# Patient Record
Sex: FEMALE | Race: WHITE | Employment: FULL TIME | URBAN - METROPOLITAN AREA
[De-identification: names, ages, dates, MRNs, and addresses within clinical notes are randomized per-mention and may not be internally consistent; named-entity substitution may affect disease eponyms.]

---

## 2022-04-09 ENCOUNTER — HISTORICAL (OUTPATIENT)
Dept: ADMINISTRATIVE | Facility: HOSPITAL | Age: 27
End: 2022-04-09

## 2022-04-25 VITALS
BODY MASS INDEX: 24.14 KG/M2 | WEIGHT: 131.19 LBS | HEIGHT: 62 IN | SYSTOLIC BLOOD PRESSURE: 117 MMHG | OXYGEN SATURATION: 99 % | DIASTOLIC BLOOD PRESSURE: 80 MMHG

## 2022-09-15 ENCOUNTER — HOSPITAL ENCOUNTER (EMERGENCY)
Facility: HOSPITAL | Age: 27
Discharge: HOME | End: 2022-09-15
Attending: INTERNAL MEDICINE
Payer: COMMERCIAL

## 2022-09-15 VITALS
WEIGHT: 130 LBS | HEART RATE: 89 BPM | DIASTOLIC BLOOD PRESSURE: 92 MMHG | OXYGEN SATURATION: 99 % | HEIGHT: 62 IN | RESPIRATION RATE: 18 BRPM | TEMPERATURE: 98 F | BODY MASS INDEX: 23.92 KG/M2 | SYSTOLIC BLOOD PRESSURE: 143 MMHG

## 2022-09-15 DIAGNOSIS — N93.8 DUB (DYSFUNCTIONAL UTERINE BLEEDING): Primary | ICD-10-CM

## 2022-09-15 DIAGNOSIS — Z32.02 ENCOUNTER FOR PREGNANCY TEST WITH RESULT NEGATIVE: ICD-10-CM

## 2022-09-15 LAB
APPEARANCE UR: CLEAR
B-HCG SERPL QL: NEGATIVE
BACTERIA #/AREA URNS AUTO: ABNORMAL /HPF
BILIRUB UR QL STRIP.AUTO: NEGATIVE MG/DL
COLOR UR AUTO: YELLOW
GLUCOSE UR QL STRIP.AUTO: NEGATIVE MG/DL
KETONES UR QL STRIP.AUTO: NEGATIVE MG/DL
LEUKOCYTE ESTERASE UR QL STRIP.AUTO: NEGATIVE UNIT/L
NITRITE UR QL STRIP.AUTO: NEGATIVE
PH UR STRIP.AUTO: 7.5 [PH]
PROT UR QL STRIP.AUTO: NEGATIVE MG/DL
RBC #/AREA URNS AUTO: ABNORMAL /HPF
RBC UR QL AUTO: ABNORMAL UNIT/L
SP GR UR STRIP.AUTO: 1.02
SQUAMOUS #/AREA URNS AUTO: ABNORMAL /HPF
UROBILINOGEN UR STRIP-ACNC: 0.2 MG/DL
WBC #/AREA URNS AUTO: ABNORMAL /HPF

## 2022-09-15 PROCEDURE — 96372 THER/PROPH/DIAG INJ SC/IM: CPT | Performed by: INTERNAL MEDICINE

## 2022-09-15 PROCEDURE — 81025 URINE PREGNANCY TEST: CPT | Performed by: INTERNAL MEDICINE

## 2022-09-15 PROCEDURE — 81001 URINALYSIS AUTO W/SCOPE: CPT | Performed by: INTERNAL MEDICINE

## 2022-09-15 PROCEDURE — 99284 EMERGENCY DEPT VISIT MOD MDM: CPT

## 2022-09-15 PROCEDURE — 63600175 PHARM REV CODE 636 W HCPCS: Performed by: INTERNAL MEDICINE

## 2022-09-15 RX ORDER — KETOROLAC TROMETHAMINE 30 MG/ML
60 INJECTION, SOLUTION INTRAMUSCULAR; INTRAVENOUS ONCE
Status: COMPLETED | OUTPATIENT
Start: 2022-09-15 | End: 2022-09-15

## 2022-09-15 RX ADMIN — KETOROLAC TROMETHAMINE 60 MG: 60 INJECTION, SOLUTION INTRAMUSCULAR at 02:09

## 2022-09-15 NOTE — ED TRIAGE NOTES
Pt c/o vaginal bleeding onset last night, along with low abd pain. States had positive pregnancy test last week.

## 2022-09-15 NOTE — ED PROVIDER NOTES
Source of History:  Patient, no limitations    Chief complaint:  Vaginal Bleeding (Pt c/o vaginal bleeding onset last night, along with low abd pain. States had positive pregnancy test last week.)      HPI:  Jana Alejo is a 27 y.o. female presenting with Vaginal Bleeding (Pt c/o vaginal bleeding onset last night, along with low abd pain. States had positive pregnancy test last week.)       Vaginal bleeding since last night, 2 positive home pregnancy tests, menstrual cycle is 1 week late, does not know blood type  The patient complains vaginal bleeding described as heavier than period, using several pad(s) since onset. Onset of symptoms was abrupt starting 1 day ago. Severity of symptoms at worst was moderate. Symptoms occur spontaneous  Symptoms have been intermittent. Symptoms are aggravated by nothing, alleviated by nothing and are associated with cramping. Other modifying factors include none. Previous evaluation includes none. Care prior to arrival consisted of nothing, with no relief.      Review of Systems   Constitutional symptoms:  Negative except as documented in HPI.   Skin symptoms:  Negative except as documented in HPI.   HEENT symptoms:  Negative except as documented in HPI.   Respiratory symptoms:  Negative except as documented in HPI.   Cardiovascular symptoms:  Negative except as documented in HPI.   Gastrointestinal symptoms:  Negative except as documented in HPI.    Genitourinary symptoms:  Negative except as documented in HPI.   Musculoskeletal symptoms:  Negative except as documented in HPI.   Neurologic symptoms:  Negative except as documented in HPI.   Psychiatric symptoms:  Negative except as documented in HPI.   Allergy/immunologic symptoms:  Negative except as documented in HPI.             Additional review of systems information: All other systems reviewed and otherwise negative.      Review of patient's allergies indicates:  No Known Allergies    PMH:  As per HPI and below:    No  "past medical history on file.     No family history on file.    No past surgical history on file.         There is no problem list on file for this patient.       Physical Exam:    BP (!) 158/104 (BP Location: Left arm, Patient Position: Sitting)   Pulse 89   Temp 98.4 °F (36.9 °C) (Temporal)   Resp 18   Ht 5' 2" (1.575 m)   Wt 59 kg (130 lb)   LMP 08/15/2022 Comment: no due date yet.  SpO2 100%   BMI 23.78 kg/m²     Nursing note and vital signs reviewed.    General:  Alert, no acute distress.   Skin: Normal for Ethnic Origin, No cyanosis  HEENT: Normocephalic and atraumatic, Vision unchanged, Pupils symmetric, No icterus , Nasal mucosa is pink and moist  Cardiovascular:  Regular rate and rhythm, No edema  Chest Wall: No deformity, equal chest rise  Respiratory:  Lungs are clear to auscultation, respirations are non-labored.    Musculoskeletal:  No deformity, Normal perfusion to all extremities  Back: No bony tenderness  : No suprapubic pain, No CVA tenderness  Gastrointestinal:  Soft, Nontender, Non distended, Normal bowel sounds.    Neurological:  Alert and oriented to person, place, time, and situation, normal motor observed, normal speech observed.    Psychiatric:  Cooperative, appropriate mood & affect.         Labs that have been ordered have been independently reviewed and interpreted by myself.     Old Chart Reviewed.      Initial Impression/ Differential Dx:  Threatened , spontaneous , implantation bleeding, placental abnormality including placenta previa or abruptio, vaginitis, UTI, rectal bleeding      MDM:      Reviewed Nurses Note.    Reviewed Pertinent old records.    Orders Placed This Encounter    Urinalysis, Reflex to Urine Culture Urine, Clean Catch    Pregnancy, urine rapid    Urinalysis, Microscopic    ketorolac injection 60 mg                    Labs Reviewed   URINALYSIS, REFLEX TO URINE CULTURE - Abnormal; Notable for the following components:       Result Value    " Blood, UA Large (*)     All other components within normal limits   URINALYSIS, MICROSCOPIC - Abnormal; Notable for the following components:    Bacteria, UA Trace (*)     RBC, UA 11-20 (*)     Squamous Epithelial Cells, UA Few (*)     All other components within normal limits   PREGNANCY TEST, URINE RAPID - Normal          No orders to display        Admission on 09/15/2022   Component Date Value Ref Range Status    Color, UA 09/15/2022 Yellow  Yellow, Colorless, Other, Clear Final    Appearance, UA 09/15/2022 Clear  Clear Final    Specific Gravity, UA 09/15/2022 1.025   Final    pH, UA 09/15/2022 7.5  5.0, 5.5, 6.0, 6.5, 7.0, 7.5, 8.0, 8.5 Final    Protein, UA 09/15/2022 Negative  Negative, 300  mg/dL Final    Glucose, UA 09/15/2022 Negative  Negative, Normal mg/dL Final    Ketones, UA 09/15/2022 Negative  Negative, +1, +2, +3, +4, +5, >=160, >=80 mg/dL Final    Blood, UA 09/15/2022 Large (A)  Negative unit/L Final    Bilirubin, UA 09/15/2022 Negative  Negative mg/dL Final    Urobilinogen, UA 09/15/2022 0.2  0.2, 1.0, Normal mg/dL Final    Nitrites, UA 09/15/2022 Negative  Negative Final    Leukocyte Esterase, UA 09/15/2022 Negative  Negative, 75  unit/L Final    Beta hCG Qualitative, Urine 09/15/2022 Negative  Negative Final    Bacteria, UA 09/15/2022 Trace (A)  None Seen, Rare, Occasional /HPF Final    RBC, UA 09/15/2022 11-20 (A)  None Seen, 0-2, 3-5, 0-5 /HPF Final    WBC, UA 09/15/2022 0-2  None Seen, 0-2, 3-5, 0-5 /HPF Final    Squamous Epithelial Cells, UA 09/15/2022 Few (A)  None Seen, Rare, Occasional, Occ /HPF Final       Imaging Results    None                                              Diagnostic Impression:    1. DUB (dysfunctional uterine bleeding)    2. Encounter for pregnancy test with result negative         ED Disposition Condition    Discharge Stable             Follow-up Information       [facility] - Emergency Dept.    Specialty: Emergency Medicine  Why: If symptoms  worsen  Contact information:  [address]  [facility] [id number] [phone]                            ED Prescriptions    None       Follow-up Information       Follow up With Specialties Details Why Contact Info    [facility] - Emergency Dept Emergency Medicine  If symptoms worsen [address]  [facility] [id number] [phone]             Stalin Pollack,   09/15/22 1430

## 2022-09-27 DIAGNOSIS — F32.A DEPRESSION, UNSPECIFIED DEPRESSION TYPE: Primary | ICD-10-CM

## 2022-09-27 RX ORDER — VENLAFAXINE HYDROCHLORIDE 150 MG/1
150 CAPSULE, EXTENDED RELEASE ORAL DAILY
Qty: 90 CAPSULE | Refills: 0 | Status: SHIPPED | OUTPATIENT
Start: 2022-09-27 | End: 2023-01-04

## 2022-09-27 RX ORDER — VENLAFAXINE HYDROCHLORIDE 150 MG/1
150 CAPSULE, EXTENDED RELEASE ORAL DAILY
COMMUNITY
Start: 2022-07-06 | End: 2022-09-27 | Stop reason: SDUPTHER

## 2022-09-27 NOTE — TELEPHONE ENCOUNTER
Pt would like refill on Venlafaxine and would like a 90 day supply, meds are working really well.

## 2022-09-27 NOTE — TELEPHONE ENCOUNTER
I have ordered the following medications. Patient will need to make an appointment for further refills. Please notify the patient.    Medications Ordered This Encounter   Medications    venlafaxine (EFFEXOR-XR) 150 MG Cp24     Sig: Take 1 capsule (150 mg total) by mouth once daily.     Dispense:  90 capsule     Refill:  0

## 2023-01-04 ENCOUNTER — OFFICE VISIT (OUTPATIENT)
Dept: FAMILY MEDICINE | Facility: CLINIC | Age: 28
End: 2023-01-04
Payer: COMMERCIAL

## 2023-01-04 VITALS — HEIGHT: 62 IN | WEIGHT: 140 LBS | BODY MASS INDEX: 25.76 KG/M2

## 2023-01-04 DIAGNOSIS — Z11.4 ENCOUNTER FOR SCREENING FOR HIV: ICD-10-CM

## 2023-01-04 DIAGNOSIS — F41.8 MIXED ANXIETY DEPRESSIVE DISORDER: Primary | ICD-10-CM

## 2023-01-04 DIAGNOSIS — Z11.59 ENCOUNTER FOR HEPATITIS C SCREENING TEST FOR LOW RISK PATIENT: ICD-10-CM

## 2023-01-04 DIAGNOSIS — Z13.220 NEED FOR LIPID SCREENING: ICD-10-CM

## 2023-01-04 DIAGNOSIS — Z00.00 ANNUAL PHYSICAL EXAM: ICD-10-CM

## 2023-01-04 PROCEDURE — 1159F PR MEDICATION LIST DOCUMENTED IN MEDICAL RECORD: ICD-10-PCS | Mod: CPTII,95,, | Performed by: STUDENT IN AN ORGANIZED HEALTH CARE EDUCATION/TRAINING PROGRAM

## 2023-01-04 PROCEDURE — 99214 PR OFFICE/OUTPT VISIT, EST, LEVL IV, 30-39 MIN: ICD-10-PCS | Mod: 95,,, | Performed by: STUDENT IN AN ORGANIZED HEALTH CARE EDUCATION/TRAINING PROGRAM

## 2023-01-04 PROCEDURE — 3008F BODY MASS INDEX DOCD: CPT | Mod: CPTII,95,, | Performed by: STUDENT IN AN ORGANIZED HEALTH CARE EDUCATION/TRAINING PROGRAM

## 2023-01-04 PROCEDURE — 99214 OFFICE O/P EST MOD 30 MIN: CPT | Mod: 95,,, | Performed by: STUDENT IN AN ORGANIZED HEALTH CARE EDUCATION/TRAINING PROGRAM

## 2023-01-04 PROCEDURE — 1159F MED LIST DOCD IN RCRD: CPT | Mod: CPTII,95,, | Performed by: STUDENT IN AN ORGANIZED HEALTH CARE EDUCATION/TRAINING PROGRAM

## 2023-01-04 PROCEDURE — 3008F PR BODY MASS INDEX (BMI) DOCUMENTED: ICD-10-PCS | Mod: CPTII,95,, | Performed by: STUDENT IN AN ORGANIZED HEALTH CARE EDUCATION/TRAINING PROGRAM

## 2023-01-04 RX ORDER — VENLAFAXINE HYDROCHLORIDE 75 MG/1
75 CAPSULE, EXTENDED RELEASE ORAL DAILY
Qty: 90 CAPSULE | Refills: 0 | Status: SHIPPED | OUTPATIENT
Start: 2023-01-04 | End: 2023-04-03

## 2023-01-04 RX ORDER — BUSPIRONE HYDROCHLORIDE 7.5 MG/1
7.5 TABLET ORAL 2 TIMES DAILY
COMMUNITY

## 2023-01-04 NOTE — PROGRESS NOTES
"Subjective:      Patient ID: Jana Alejo is a 27 y.o.  female.    Chief Complaint: Telemedicine Visit    Anxiety/Depression: Patient requesting a decrease of her Effexor. Patient taking Buspar BID PRN.    Review of Systems   Constitutional:  Negative for activity change.   HENT:  Negative for hearing loss, rhinorrhea and trouble swallowing.    Eyes:  Negative for discharge and visual disturbance.   Respiratory:  Negative for chest tightness and wheezing.    Cardiovascular:  Negative for chest pain and palpitations.   Gastrointestinal:  Negative for blood in stool, constipation, diarrhea and vomiting.   Endocrine: Negative for polydipsia and polyuria.   Genitourinary:  Negative for difficulty urinating, dysuria, hematuria and menstrual problem.   Musculoskeletal:  Negative for arthralgias, joint swelling and neck pain.   Psychiatric/Behavioral:  Negative for self-injury, sleep disturbance and suicidal ideas. The patient is nervous/anxious.      Objective:   Ht 5' 2" (1.575 m)   Wt 63.5 kg (140 lb)   LMP 12/02/2022   Breastfeeding No   BMI 25.61 kg/m²     Physical Exam  Nursing note reviewed.   Constitutional:       General: She is not in acute distress.     Appearance: Normal appearance. She is not ill-appearing, toxic-appearing or diaphoretic.   Pulmonary:      Effort: No respiratory distress.   Neurological:      Mental Status: She is alert. Mental status is at baseline.   Psychiatric:         Mood and Affect: Mood normal.         Behavior: Behavior normal.         Thought Content: Thought content normal.         Judgment: Judgment normal.     Assessment/Plan:   1. Mixed anxiety depressive disorder  Assessment & Plan:  - Decreasing Effexor from 150mg to 75mg daily.  - Continue Buspar 7.5mg BID PRN.  - Re-evaluation in 1 month.    Orders:  -     venlafaxine (EFFEXOR-XR) 75 MG 24 hr capsule; Take 1 capsule (75 mg total) by mouth once daily.  Dispense: 90 capsule; Refill: 0    2. Annual physical exam  - "     Comprehensive Metabolic Panel; Future; Expected date: 01/04/2023  -     HIV 1/2 Ag/Ab (4th Gen); Future; Expected date: 01/04/2023  -     Lipid Panel; Future; Expected date: 01/04/2023  -     Hepatitis C Antibody; Future; Expected date: 01/04/2023    3. Need for lipid screening  -     Lipid Panel; Future; Expected date: 01/04/2023    4. Encounter for screening for HIV  -     HIV 1/2 Ag/Ab (4th Gen); Future; Expected date: 01/04/2023    5. Encounter for hepatitis C screening test for low risk patient  -     Hepatitis C Antibody; Future; Expected date: 01/04/2023       Follow up in about 1 month (around 2/4/2023) for Wellness.      This is a telemedicine note. Patient was treated using telemedicine, real time audio and video, according to [facility] protocols. Bell RASMUSSEN DO, conducted the visit from location identified below. The patient participated in the visit at a non-[facility] location selected by the patient (or patient's representative), identified below. I am licensed in a state where the patient stated they are located. The patient (or patient's representative) stated they understood and accepted the privacy and security risks to their information at their location. Patient was located at work.    Participant in the visit: Patient    I, distant provider, was located at: [facility]  [address] [place] [address] [address]  [place] [address]    Face-to-face time spent with patient was 10 minutes, over 50% of which was used for education and counseling regarding medical conditions, current medications including risk/benefit and side effects/adverse events, over-the-counter medications-uses/doses, home self-care and contact precautions, and red flags and indications for immediate medical attention. The patient was receptive, expressed understanding, and was agreeable to plan. All questions answered.

## 2023-06-29 DIAGNOSIS — F41.8 MIXED ANXIETY DEPRESSIVE DISORDER: Primary | ICD-10-CM

## 2023-06-29 RX ORDER — VENLAFAXINE HYDROCHLORIDE 75 MG/1
CAPSULE, EXTENDED RELEASE ORAL
Qty: 90 CAPSULE | Refills: 0 | Status: SHIPPED | OUTPATIENT
Start: 2023-06-29 | End: 2023-10-20 | Stop reason: DRUGHIGH

## 2023-10-20 DIAGNOSIS — F41.8 MIXED ANXIETY DEPRESSIVE DISORDER: Primary | ICD-10-CM

## 2023-10-20 RX ORDER — VENLAFAXINE HYDROCHLORIDE 37.5 MG/1
37.5 CAPSULE, EXTENDED RELEASE ORAL DAILY
Qty: 90 CAPSULE | Refills: 0 | Status: SHIPPED | OUTPATIENT
Start: 2023-10-20 | End: 2024-10-19

## 2023-10-20 RX ORDER — VENLAFAXINE HYDROCHLORIDE 75 MG/1
75 CAPSULE, EXTENDED RELEASE ORAL DAILY
Qty: 90 CAPSULE | Refills: 0 | Status: CANCELLED | OUTPATIENT
Start: 2023-10-20

## 2023-10-20 NOTE — TELEPHONE ENCOUNTER
Can decrease to 37.5mg daily. Will prescribe for patient at lower dose.    I have ordered the following medication. Please notify the patient.    Medications Ordered This Encounter   Medications    venlafaxine (EFFEXOR-XR) 37.5 MG 24 hr capsule     Sig: Take 1 capsule (37.5 mg total) by mouth once daily.     Dispense:  90 capsule     Refill:  0

## 2023-10-20 NOTE — TELEPHONE ENCOUNTER
----- Message from Paige Senegal sent at 10/20/2023  8:23 AM CDT -----  .Type:  RX Refill Request    Who Called:  pt    Refill or New Rx: refill    RX Name and Strength: venlafaxine (EFFEXOR-XR) 75 MG 24 hr capsule    How is the patient currently taking it? (ex. 1XDay): one day    Is this a 30 day or 90 day RX: 30    Preferred Pharmacy with phone number: [facility] [address]    Local or Mail Order: local    Ordering Provider: Bell    Would the patient rather a call back? Yes    Best Call Back Number: [phone]    Additional Information:  pt is trying to get off of this medication she ask if she can go down in the strength

## 2025-06-19 PROBLEM — O21.9 NAUSEA/VOMITING IN PREGNANCY: Status: ACTIVE | Noted: 2025-06-19

## 2025-06-19 PROBLEM — Z34.80 SUPERVISION OF OTHER NORMAL PREGNANCY, ANTEPARTUM: Status: ACTIVE | Noted: 2025-06-19

## 2025-06-19 PROCEDURE — 87086 URINE CULTURE/COLONY COUNT: CPT | Performed by: STUDENT IN AN ORGANIZED HEALTH CARE EDUCATION/TRAINING PROGRAM

## 2025-06-19 PROCEDURE — 87661 TRICHOMONAS VAGINALIS AMPLIF: CPT | Performed by: STUDENT IN AN ORGANIZED HEALTH CARE EDUCATION/TRAINING PROGRAM

## 2025-06-19 PROCEDURE — 87491 CHLMYD TRACH DNA AMP PROBE: CPT | Performed by: STUDENT IN AN ORGANIZED HEALTH CARE EDUCATION/TRAINING PROGRAM

## 2025-06-29 ENCOUNTER — HOSPITAL ENCOUNTER (EMERGENCY)
Facility: HOSPITAL | Age: 30
Discharge: HOME | End: 2025-06-29
Attending: STUDENT IN AN ORGANIZED HEALTH CARE EDUCATION/TRAINING PROGRAM
Payer: MEDICAID

## 2025-06-29 VITALS
RESPIRATION RATE: 17 BRPM | OXYGEN SATURATION: 100 % | WEIGHT: 130.5 LBS | DIASTOLIC BLOOD PRESSURE: 75 MMHG | BODY MASS INDEX: 24.01 KG/M2 | SYSTOLIC BLOOD PRESSURE: 117 MMHG | HEART RATE: 66 BPM | HEIGHT: 62 IN | TEMPERATURE: 98 F

## 2025-06-29 DIAGNOSIS — R00.1 BRADYCARDIA: ICD-10-CM

## 2025-06-29 DIAGNOSIS — N39.0 URINARY TRACT INFECTION WITHOUT HEMATURIA, SITE UNSPECIFIED: Primary | ICD-10-CM

## 2025-06-29 DIAGNOSIS — R33.9 URINE RETENTION: ICD-10-CM

## 2025-06-29 LAB
ALBUMIN SERPL-MCNC: 3.5 G/DL (ref 3.5–5)
ALBUMIN/GLOB SERPL: 1 RATIO (ref 1.1–2)
ALP SERPL-CCNC: 43 UNIT/L (ref 40–150)
ALT SERPL-CCNC: 12 UNIT/L (ref 0–55)
ANION GAP SERPL CALC-SCNC: 9 MEQ/L
AST SERPL-CCNC: 13 UNIT/L (ref 11–45)
BACTERIA #/AREA URNS AUTO: ABNORMAL /HPF
BASOPHILS # BLD AUTO: 0.03 X10(3)/MCL
BASOPHILS NFR BLD AUTO: 0.3 %
BILIRUB SERPL-MCNC: 0.4 MG/DL
BILIRUB UR QL STRIP.AUTO: NEGATIVE
BUN SERPL-MCNC: 6.2 MG/DL (ref 7–18.7)
CALCIUM SERPL-MCNC: 9 MG/DL (ref 8.4–10.2)
CHLORIDE SERPL-SCNC: 105 MMOL/L (ref 98–107)
CLARITY UR: CLEAR
CO2 SERPL-SCNC: 21 MMOL/L (ref 22–29)
COLOR UR AUTO: ABNORMAL
CREAT SERPL-MCNC: 0.62 MG/DL (ref 0.55–1.02)
CREAT/UREA NIT SERPL: 10
EOSINOPHIL # BLD AUTO: 0.02 X10(3)/MCL (ref 0–0.9)
EOSINOPHIL NFR BLD AUTO: 0.2 %
ERYTHROCYTE [DISTWIDTH] IN BLOOD BY AUTOMATED COUNT: 15.8 % (ref 11.5–17)
GFR SERPLBLD CREATININE-BSD FMLA CKD-EPI: >60 ML/MIN/1.73/M2
GLOBULIN SER-MCNC: 3.5 GM/DL (ref 2.4–3.5)
GLUCOSE SERPL-MCNC: 124 MG/DL (ref 74–100)
GLUCOSE UR QL STRIP: NORMAL
HCT VFR BLD AUTO: 32.6 % (ref 37–47)
HGB BLD-MCNC: 10.3 G/DL (ref 12–16)
HGB UR QL STRIP: NEGATIVE
IMM GRANULOCYTES # BLD AUTO: 0.02 X10(3)/MCL (ref 0–0.04)
IMM GRANULOCYTES NFR BLD AUTO: 0.2 %
KETONES UR QL STRIP: ABNORMAL
LEUKOCYTE ESTERASE UR QL STRIP: NEGATIVE
LYMPHOCYTES # BLD AUTO: 0.8 X10(3)/MCL (ref 0.6–4.6)
LYMPHOCYTES NFR BLD AUTO: 8.8 %
MCH RBC QN AUTO: 23.1 PG (ref 27–31)
MCHC RBC AUTO-ENTMCNC: 31.6 G/DL (ref 33–36)
MCV RBC AUTO: 73.3 FL (ref 80–94)
MONOCYTES # BLD AUTO: 0.46 X10(3)/MCL (ref 0.1–1.3)
MONOCYTES NFR BLD AUTO: 5.1 %
NEUTROPHILS # BLD AUTO: 7.71 X10(3)/MCL (ref 2.1–9.2)
NEUTROPHILS NFR BLD AUTO: 85.4 %
NITRITE UR QL STRIP: ABNORMAL
NRBC BLD AUTO-RTO: 0 %
OHS QRS DURATION: 96 MS
OHS QTC CALCULATION: 411 MS
PH UR STRIP: 6.5 [PH]
PLATELET # BLD AUTO: 274 X10(3)/MCL (ref 130–400)
PMV BLD AUTO: 10.2 FL (ref 7.4–10.4)
POTASSIUM SERPL-SCNC: 3.6 MMOL/L (ref 3.5–5.1)
PROT SERPL-MCNC: 7 GM/DL (ref 6.4–8.3)
PROT UR QL STRIP: NEGATIVE
RBC # BLD AUTO: 4.45 X10(6)/MCL (ref 4.2–5.4)
RBC #/AREA URNS AUTO: ABNORMAL /HPF
SODIUM SERPL-SCNC: 135 MMOL/L (ref 136–145)
SP GR UR STRIP.AUTO: 1.01 (ref 1–1.03)
SQUAMOUS #/AREA URNS LPF: ABNORMAL /HPF
UROBILINOGEN UR STRIP-ACNC: NORMAL
WBC # BLD AUTO: 9.04 X10(3)/MCL (ref 4.5–11.5)
WBC #/AREA URNS AUTO: ABNORMAL /HPF

## 2025-06-29 PROCEDURE — P9612 CATHETERIZE FOR URINE SPEC: HCPCS

## 2025-06-29 PROCEDURE — 93010 ELECTROCARDIOGRAM REPORT: CPT | Mod: ,,, | Performed by: INTERNAL MEDICINE

## 2025-06-29 PROCEDURE — 81001 URINALYSIS AUTO W/SCOPE: CPT | Performed by: PHYSICIAN ASSISTANT

## 2025-06-29 PROCEDURE — 96374 THER/PROPH/DIAG INJ IV PUSH: CPT

## 2025-06-29 PROCEDURE — 63600175 PHARM REV CODE 636 W HCPCS: Performed by: PHYSICIAN ASSISTANT

## 2025-06-29 PROCEDURE — 51798 US URINE CAPACITY MEASURE: CPT

## 2025-06-29 PROCEDURE — 93005 ELECTROCARDIOGRAM TRACING: CPT

## 2025-06-29 PROCEDURE — 96375 TX/PRO/DX INJ NEW DRUG ADDON: CPT

## 2025-06-29 PROCEDURE — 80053 COMPREHEN METABOLIC PANEL: CPT | Performed by: PHYSICIAN ASSISTANT

## 2025-06-29 PROCEDURE — 85025 COMPLETE CBC W/AUTO DIFF WBC: CPT | Performed by: PHYSICIAN ASSISTANT

## 2025-06-29 PROCEDURE — 99285 EMERGENCY DEPT VISIT HI MDM: CPT | Mod: 25

## 2025-06-29 RX ORDER — ACETAMINOPHEN 10 MG/ML
1000 INJECTION, SOLUTION INTRAVENOUS ONCE
Status: COMPLETED | OUTPATIENT
Start: 2025-06-29 | End: 2025-06-29

## 2025-06-29 RX ORDER — CEFTRIAXONE 1 G/1
1 INJECTION, POWDER, FOR SOLUTION INTRAMUSCULAR; INTRAVENOUS
Status: COMPLETED | OUTPATIENT
Start: 2025-06-29 | End: 2025-06-29

## 2025-06-29 RX ORDER — CEPHALEXIN 500 MG/1
500 CAPSULE ORAL EVERY 12 HOURS
Qty: 20 CAPSULE | Refills: 0 | Status: SHIPPED | OUTPATIENT
Start: 2025-06-29 | End: 2025-07-09

## 2025-06-29 RX ADMIN — CEFTRIAXONE SODIUM 1 G: 1 INJECTION, POWDER, FOR SOLUTION INTRAMUSCULAR; INTRAVENOUS at 04:06

## 2025-06-29 RX ADMIN — ACETAMINOPHEN 1000 MG: 10 INJECTION, SOLUTION INTRAVENOUS at 12:06

## 2025-06-29 NOTE — ED NOTES
Pt AAOx4, VSS, educated on discharge instructions, new medications, follow up appointments, and signs and symptoms to return to the ED with. Pt educated to perform pelvic rest and no intercourse till anatomy scan with ob. All questions answered. Pt verbalized understanding. Peripheral IV discontinued and gauze/tape applied to site with no signs of bleeding or swelling noted. Pt ambulated self upon discharge.

## 2025-06-29 NOTE — Clinical Note
"Jana Morin" Melo was seen and treated in our emergency department on 6/29/2025.  She may return to work on 07/02/2025.  Please excuse for up to 48hours prior for symptoms present at that time if possible. Patient may also return sooner than above date if symptoms improve/resolve.       If you have any questions or concerns, please don't hesitate to call.      Ngozi Nixon PA"

## 2025-06-29 NOTE — FIRST PROVIDER EVALUATION
Medical screening examination initiated.  I have conducted a focused provider triage encounter, findings are as follows:    Brief history of present illness:  29yoWF  currently 13weeks pregnant presents to the ER for urinary retention x 6hours w/increased discomfort. No vaginal bleeding. No nausea or vomiting.       OBGYN: Dr. Dunne    There were no vitals filed for this visit.    Pertinent physical exam:  crying/tearful. Ambulatory. GCS 15.     Brief workup plan:  labs and imaging if needed.     Preliminary workup initiated; this workup will be continued and followed by the physician or advanced practice provider that is assigned to the patient when roomed.

## 2025-06-29 NOTE — ED PROVIDER NOTES
Encounter Date: 2025       History     Chief Complaint   Patient presents with    Urinary Retention     Patient arrives POV with c/o urinary retention x 6 hours, , OB Dr Dunne, denies n/v, vaginal bleeding     See MDM for details.      The history is provided by the patient. No  was used.     Review of patient's allergies indicates:  No Known Allergies  Past Medical History:   Diagnosis Date    Anxiety     Depression     Migraines      Past Surgical History:   Procedure Laterality Date    EPISIOTOMY      NASAL SEPTUM SURGERY      TONSILLECTOMY       Family History   Problem Relation Name Age of Onset    Leukemia Father Maco Alejo     Cancer Father Maco Alejo         Lympho sarcoma and leukemia    Depression Brother       Social History[1]  Review of Systems   Constitutional: Negative.    HENT: Negative.     Eyes: Negative.    Respiratory: Negative.     Cardiovascular: Negative.    Gastrointestinal:  Positive for abdominal pain.   Genitourinary: Negative.    Musculoskeletal: Negative.    Skin:  Negative for wound.   Neurological: Negative.    All other systems reviewed and are negative.      Physical Exam     Initial Vitals [25 1108]   BP Pulse Resp Temp SpO2   (!) 141/86 96 20 97.6 °F (36.4 °C) 99 %      MAP       --         Physical Exam    Nursing note and vitals reviewed.  Constitutional: She appears well-developed and well-nourished. No distress.   HENT:   Head: Normocephalic and atraumatic.   Eyes: Conjunctivae, EOM and lids are normal. Pupils are equal, round, and reactive to light.   Neck: Neck supple.   Normal range of motion.  Cardiovascular:  Normal rate and regular rhythm.           Pulmonary/Chest: Effort normal and breath sounds normal.   Abdominal: Abdomen is soft and flat. Bowel sounds are normal. She exhibits no distension and no mass. There is no abdominal tenderness.   Uncomfortable on exam-  abdomen. Suprapubic tenderness. There is no  rebound and no guarding.   Musculoskeletal:      Cervical back: Normal range of motion and neck supple.     Neurological: She is alert and oriented to person, place, and time. She has normal strength. She is not disoriented. No cranial nerve deficit or sensory deficit.   Skin: Skin is warm and intact.   Psychiatric: She has a normal mood and affect. Her speech is normal and behavior is normal. Judgment and thought content normal. Cognition and memory are normal.         ED Course   Procedures  Labs Reviewed   COMPREHENSIVE METABOLIC PANEL - Abnormal       Result Value    Sodium 135 (*)     Potassium 3.6      Chloride 105      CO2 21 (*)     Glucose 124 (*)     Blood Urea Nitrogen 6.2 (*)     Creatinine 0.62      Calcium 9.0      Protein Total 7.0      Albumin 3.5      Globulin 3.5      Albumin/Globulin Ratio 1.0 (*)     Bilirubin Total 0.4      ALP 43      ALT 12      AST 13      eGFR >60      Anion Gap 9.0      BUN/Creatinine Ratio 10     CBC WITH DIFFERENTIAL - Abnormal    WBC 9.04      RBC 4.45      Hgb 10.3 (*)     Hct 32.6 (*)     MCV 73.3 (*)     MCH 23.1 (*)     MCHC 31.6 (*)     RDW 15.8      Platelet 274      MPV 10.2      Neut % 85.4      Lymph % 8.8      Mono % 5.1      Eos % 0.2      Basophil % 0.3      Imm Grans % 0.2      Neut # 7.71      Lymph # 0.80      Mono # 0.46      Eos # 0.02      Baso # 0.03      Imm Gran # 0.02      NRBC% 0.0     URINALYSIS, REFLEX TO URINE CULTURE - Abnormal    Color, UA Dark-Yellow      Appearance, UA Clear      Specific Gravity, UA 1.010      pH, UA 6.5      Protein, UA Negative      Glucose, UA Normal      Ketones, UA 1+ (*)     Blood, UA Negative      Bilirubin, UA Negative      Urobilinogen, UA Normal      Nitrites, UA 1+ (*)     Leukocyte Esterase, UA Negative      RBC, UA None Seen      WBC, UA 0-5      Bacteria, UA None Seen      Squamous Epithelial Cells, UA Trace     CBC W/ AUTO DIFFERENTIAL    Narrative:     The following orders were created for panel order CBC  auto differential.  Procedure                               Abnormality         Status                     ---------                               -----------         ------                     CBC with Differential[[id number]]        Abnormal            Final result                 Please view results for these tests on the individual orders.     EKG Readings: (Independently Interpreted)   Initial Reading: No STEMI. Rhythm: Sinus Bradycardia. Heart Rate: 56.       Imaging Results              US Abdomen Complete (Final result)  Result time 06/29/25 15:07:12      Final result by Shruti Fairbanks MD (06/29/25 15:07:12)                   Impression:      Bladder volume calculated approximately 700 mL      Electronically signed by: Shruti Fairbanks  Date:    06/29/2025  Time:    15:07               Narrative:    EXAMINATION:  US ABDOMEN COMPLETE    CLINICAL HISTORY:  Retention of urine, unspecified    TECHNIQUE:  Complete abdominal ultrasound (including pancreas, aorta, liver, gallbladder, common bile duct, IVC, kidneys, and spleen) was performed.    COMPARISON:  None    FINDINGS:  LIVER: Liver measures 12.7 cm cranial caudal at the midclavicular line. Normal echotexture. No focal mass appreciable. Portal vein is patent with appropriate directional flow.    PANCREAS: The visualized portions of pancreas appear normal.    GALLBLADDER: No shadowing calculi, wall thickening, or pericholecystic fluid.    BILE DUCTS: 3-4 mm common bile duct. Distal common bile duct is obscured by shadowing bowel gas.    AORTA: No aneurysm.    INFERIOR VENA CAVA: Normal in appearance.    RIGHT KIDNEY: 9.3 cm. No hydronephrosis.    LEFT KIDNEY: 9 cm. No hydronephrosis.    SPLEEN: 10.7 cm.  Normal in size with homogeneous echotexture.    OTHER: No ascites.  Bladder volume approximately 700 mL.                                       US OB Limited 1 Or More Gestations (Final result)  Result time 06/29/25 12:37:07   Procedure changed from US OB  <14 Wks TransAbd & TransVag, Single Gestation (XPD)     Final result by Ricky Ibanez MD (06/29/25 12:37:07)                   Impression:      Single viable intrauterine pregnancy.      Electronically signed by: Ricky Ibanez  Date:    06/29/2025  Time:    12:37               Narrative:    EXAMINATION:  US OB LIMITED 1 OR MORE GESTATIONS    CLINICAL HISTORY:  abdominal pain;    TECHNIQUE:  Multiple real-time images were performed pelvis in various planes by the sonographer.    COMPARISON:  None available    FINDINGS:  There is single viable intrauterine pregnancy.  Fetal heart rate is 151 beats per minute.  Crown-rump length is 7.69 cm consistent with a mean age of 13 weeks and 6 days.  Placenta is posterior.  There is early previa versus low lying placenta.  Follow-up exams are recommended.    Bilateral ovaries appear unremarkable.  Bilateral unremarkable Doppler flow to the ovaries.  There is no hydronephrosis.                                       Medications   acetaminophen 1,000 mg/100 mL (10 mg/mL) injection 1,000 mg (0 mg Intravenous Stopped 6/29/25 1304)   cefTRIAXone injection 1 g (1 g Intravenous Given 6/29/25 1622)     Medical Decision Making  29yoWF w/hx of anxiety/depression presents to the ER with abdominal pain, pelvic pressure, and urinary retention x 7hours. Last bowel movement 12hours ago. Denies nausea,vomiting, fever, chest pain, chills, or shortness of breath.           Problems Addressed:  Bradycardia:     Details: One episode of bradycardia noted.  Patient in no acute distress.  Resting at the time.  Normotensive with regular rate and rhythm prior to discharge.  Urine retention: acute illness or injury     Details: Differential diagnosis included but not limited to:  UTI, dysuria, metabolic dysfunction, outlet obstruction, constipation, pregnancy related retention, other etiology     Positive nitrites.  No acute abnormalities found on abdominal or transvaginal ultrasound.  She does have a  posterior placenta, so she was given recommendations for pelvic rest and no intercourse until she has her anatomy scan at 16-20 weeks.  Patient stating she is ready to go home. Dr. Escobedo, on-call OBGYN, recommends antibiotics for UTI w/follow up with Dr. Dunne tomorrow. Patient agreeable to plan. Dr. Rivas agrees with plan as well.  All questions asked and answered at the time of the visit. Strict ER precautions given. Patient and family members agreeable to plan.       Amount and/or Complexity of Data Reviewed  Labs: ordered. Decision-making details documented in ED Course.  Radiology: ordered. Decision-making details documented in ED Course.  ECG/medicine tests: ordered. Decision-making details documented in ED Course.    Risk  Prescription drug management.               ED Course as of 06/29/25 1642   Sun Jun 29, 2025   1305 US OB Limited 1 Or More Gestations  FINDINGS:  There is single viable intrauterine pregnancy.  Fetal heart rate is 151 beats per minute.  Crown-rump length is 7.69 cm consistent with a mean age of 13 weeks and 6 days.  Placenta is posterior.  There is early previa versus low lying placenta.  Follow-up exams are recommended.     Bilateral ovaries appear unremarkable.  Bilateral unremarkable Doppler flow to the ovaries.  There is no hydronephrosis.     Impression:     Single viable intrauterine pregnancy.        Electronically signed by:Ricky Ibanez  Date:                                            06/29/2025  Time:                                           12:37           [ST]   1306 ALP: 43 [ST]   1306 ALT: 12 [ST]   1306 AST: 13 [ST]   1306 BUN(!): 6.2 [ST]   1306 Creatinine: 0.62 [ST]   1306 WBC: 9.04 [ST]   1406 Ketones, UA(!): 1+ [ST]   1406 NITRITE UA(!): 1+ [ST]   1511 US Abdomen Complete  Impression:     Bladder volume calculated approximately 700 mL        Electronically signed by:Shruti Fairbanks  Date:                                            06/29/2025  Time:                                            15:07      Exam Ended: 06/29/25 13:41 CDT Last Resulted: 06/29/25 15:07 CDT   Rick as an Unsuccessful Attempt        [ST]   1633 Patient stating she is ready to go home.  [ST]      ED Course User Index  [ST] Ngozi Nixon PA                           Clinical Impression:  Final diagnoses:  [R33.9] Urine retention  [R00.1] Bradycardia  [N39.0] Urinary tract infection without hematuria, site unspecified (Primary)          ED Disposition Condition    Discharge Stable          ED Prescriptions       Medication Sig Dispense Start Date End Date Auth. Provider    cephALEXin (KEFLEX) 500 MG capsule Take 1 capsule (500 mg total) by mouth every 12 (twelve) hours. for 10 days 20 capsule 6/29/2025 7/9/2025 Ngozi Nixon PA          Follow-up Information       Follow up With Specialties Details Why Contact Info    [facility] - Emergency Dept Emergency Medicine  As needed, If symptoms worsen [address] [place] [phone]    Bell Reeder, DO Family Medicine Call   [address]  [facility] [address]  [phone]      Marquise Dunne MD Obstetrics and Gynecology Schedule an appointment as soon as possible for a visit   [address]  [place] [address]  [phone]            This note was typed partially using voice recognition software.  Please be reminded that not all corrections/addendums to grammar may have been made prior to closing of this chart.           [1]   Social History  Tobacco Use    Smoking status: Every Day     Types: Vaping with nicotine    Smokeless tobacco: Never    Tobacco comments:     Used as an oral fixation to calm myself. Attempted to quit.   Substance Use Topics    Alcohol use: Not Currently    Drug use: Never        Ngozi Nixon PA  06/29/25 3058

## 2025-07-01 ENCOUNTER — HOSPITAL ENCOUNTER (OUTPATIENT)
Facility: HOSPITAL | Age: 30
Discharge: HOME | End: 2025-07-01
Attending: STUDENT IN AN ORGANIZED HEALTH CARE EDUCATION/TRAINING PROGRAM | Admitting: STUDENT IN AN ORGANIZED HEALTH CARE EDUCATION/TRAINING PROGRAM
Payer: MEDICAID

## 2025-07-01 VITALS
DIASTOLIC BLOOD PRESSURE: 68 MMHG | HEART RATE: 59 BPM | RESPIRATION RATE: 18 BRPM | TEMPERATURE: 98 F | OXYGEN SATURATION: 100 % | SYSTOLIC BLOOD PRESSURE: 115 MMHG

## 2025-07-01 DIAGNOSIS — R33.9 URINARY RETENTION: ICD-10-CM

## 2025-07-01 PROBLEM — R33.8 ACUTE URINARY RETENTION: Status: ACTIVE | Noted: 2025-07-01

## 2025-07-01 LAB
BACTERIA #/AREA URNS AUTO: NORMAL /HPF
BILIRUB UR QL STRIP.AUTO: NEGATIVE
CLARITY UR: CLEAR
COLOR UR AUTO: NORMAL
GLUCOSE UR QL STRIP: NORMAL
HGB UR QL STRIP: NEGATIVE
KETONES UR QL STRIP: NEGATIVE
LEUKOCYTE ESTERASE UR QL STRIP: NEGATIVE
NITRITE UR QL STRIP: NEGATIVE
PH UR STRIP: 5 [PH]
PROT UR QL STRIP: NEGATIVE
RBC #/AREA URNS AUTO: NORMAL /HPF
SP GR UR STRIP.AUTO: 1.01 (ref 1–1.03)
SQUAMOUS #/AREA URNS LPF: NORMAL /HPF
UROBILINOGEN UR STRIP-ACNC: NORMAL
WBC #/AREA URNS AUTO: NORMAL /HPF

## 2025-07-01 PROCEDURE — G0379 DIRECT REFER HOSPITAL OBSERV: HCPCS

## 2025-07-01 PROCEDURE — 51702 INSERT TEMP BLADDER CATH: CPT

## 2025-07-01 PROCEDURE — 25000003 PHARM REV CODE 250: Performed by: STUDENT IN AN ORGANIZED HEALTH CARE EDUCATION/TRAINING PROGRAM

## 2025-07-01 PROCEDURE — 81015 MICROSCOPIC EXAM OF URINE: CPT | Performed by: STUDENT IN AN ORGANIZED HEALTH CARE EDUCATION/TRAINING PROGRAM

## 2025-07-01 PROCEDURE — 87086 URINE CULTURE/COLONY COUNT: CPT | Performed by: STUDENT IN AN ORGANIZED HEALTH CARE EDUCATION/TRAINING PROGRAM

## 2025-07-01 PROCEDURE — G0378 HOSPITAL OBSERVATION PER HR: HCPCS

## 2025-07-01 RX ORDER — BISACODYL 10 MG/1
10 SUPPOSITORY RECTAL DAILY PRN
Status: DISCONTINUED | OUTPATIENT
Start: 2025-07-01 | End: 2025-07-01 | Stop reason: HOSPADM

## 2025-07-01 RX ADMIN — BISACODYL 10 MG: 10 SUPPOSITORY RECTAL at 04:07

## 2025-07-01 NOTE — CONSULTS
Jana Alejo 1995  [id number]  7/1/2025    CONSULTING PHYSICIAN: Dr. Dunne    REASON FOR CONSULTATION:     disuria; 2nd day of Keflex     HPI:  The patient is a 29-year-old female who is 14 weeks pregnant.  She presented to the emergency room on Sunday complaining of difficulty emptying her bladder.  Lab work without leukocytosis, normal creatinine; UA normal except 1+ nitrites.  Abdominal ultrasound revealed normal appearance of bilateral kidneys without hydronephrosis, bladder volume 700 mL.  She was discharged with Keflex.  In out catheterization was not performed.  She reports since Sunday, she continues only being able to void spontaneously a small amount of urine at a time and applies manual pressure to empty her bladder.  She denies dysuria, hematuria, fever or chills.  She was scheduled for follow up with her OB in the office today.  She continued with complaints of difficulty urinating and pelvic/suprapubic pain.  She was direct admitted to the hospital.  Adhikari catheter was placed on arrival with about 50 cc clear yellow urine obtained.  She has been hemodynamically stable and afebrile since arrival.  Repeat UA today with clear light yellow urine, negative nitrites, negative leukocyte esterase, no RBC, 0-5 WBC, trace bacteria, trace squamous epithelial cells.  Urine culture has been sent.    The patient is resting in bed.  She denies any history of difficulty emptying her bladder.  She denies frequent UTIs.  She denies any prior urologic surgeries.  She did not have any of these symptoms with her 1st pregnancy.  She does report constipation.  Her last bowel movement was Saturday and she reports only passing a small amount of stool with feeling of incomplete defecation.  She has not taken any over-the-counter medicines for constipation.    Past Medical History:   Diagnosis Date    Anxiety     Depression     Migraines      Past Surgical History:   Procedure Laterality Date    EPISIOTOMY      NASAL SEPTUM  SURGERY      TONSILLECTOMY       Family History   Problem Relation Name Age of Onset    Leukemia Father Maco Alejo     Cancer Father Maco Alejo         Lympho sarcoma and leukemia    Depression Brother       Social History[1]  Current Medications[2]  Review of patient's allergies indicates:  No Known Allergies    ROS: 12 point review of systems negative other than the HPI    PHYSICAL EXAM:  Vitals:    07/01/25 1430   BP: 112/72   Pulse: 66   Resp: 20   Temp: 98.4 °F (36.9 °C)       Intake/Output Summary (Last 24 hours) at 7/1/2025 1614  Last data filed at 7/1/2025 1445  Gross per 24 hour   Intake --   Output 50 ml   Net -50 ml       GEN: WN/WD NAD  HEENT: normocephalic, atraumatic, PERRLA, EOMI, OP clear, nares patent  CV: RRR  RESP: Even and unlabored  ABD:  Gravid, nondistended, mild tenderness across her lower abdomen  :  Clear light yellow urine in  bag  EXT: no C/C/E  NEURO: no focal deficits, MAEW, AAOx4    LABS:  Recent Results (from the past 24 hours)   Urinalysis    Collection Time: 07/01/25  3:29 PM   Result Value Ref Range    Color, UA Light-Yellow Yellow, Light-Yellow, Colorless, Straw, Dark-Yellow    Appearance, UA Clear Clear    Specific Gravity, UA 1.015 1.005 - 1.030    pH, UA 5.0 5.0 - 8.5    Protein, UA Negative Negative    Glucose, UA Normal Negative, Normal    Ketones, UA Negative Negative    Blood, UA Negative Negative    Bilirubin, UA Negative Negative    Urobilinogen, UA Normal 0.2, 1.0, Normal    Nitrites, UA Negative Negative    Leukocyte Esterase, UA Negative Negative    RBC, UA None Seen None Seen, 0-2, 3-5, 0-5 /HPF    WBC, UA 0-5 None Seen, 0-2, 3-5, 0-5 /HPF    Bacteria, UA Trace None Seen, Trace /HPF    Squamous Epithelial Cells, UA Trace None Seen, Trace /HPF       IMAGING:  US Abdomen Complete  Order: [id number]   Status: Final result       Next appt: 07/16/2025 at 03:30 PM in Obstetrics and Gynecology (Marquise Dunne MD)       Dx: Urine retention    Test Result  Released: Yes (seen)    0 Result Notes  Details    Reading Physician Reading Date Result Priority   Shruti Fairbanks MD  [phone]  6/29/2025 STAT     Narrative & Impression  EXAMINATION:  US ABDOMEN COMPLETE     CLINICAL HISTORY:  Retention of urine, unspecified     TECHNIQUE:  Complete abdominal ultrasound (including pancreas, aorta, liver, gallbladder, common bile duct, IVC, kidneys, and spleen) was performed.     COMPARISON:  None     FINDINGS:  LIVER: Liver measures 12.7 cm cranial caudal at the midclavicular line. Normal echotexture. No focal mass appreciable. Portal vein is patent with appropriate directional flow.     PANCREAS: The visualized portions of pancreas appear normal.     GALLBLADDER: No shadowing calculi, wall thickening, or pericholecystic fluid.     BILE DUCTS: 3-4 mm common bile duct. Distal common bile duct is obscured by shadowing bowel gas.     AORTA: No aneurysm.     INFERIOR VENA CAVA: Normal in appearance.     RIGHT KIDNEY: 9.3 cm. No hydronephrosis.     LEFT KIDNEY: 9 cm. No hydronephrosis.     SPLEEN: 10.7 cm.  Normal in size with homogeneous echotexture.     OTHER: No ascites.  Bladder volume approximately 700 mL.     Impression:     Bladder volume calculated approximately 700 mL        Electronically signed by:Shruti Fairbanks  Date:                                            06/29/2025  Time:                                           15:07       ASSESSMENT:  29-year-old female who is 14 weeks pregnant presents with difficulty urinating  -no significant hematuria, about 50 cc urine obtained when Adhikari catheter placed today   -urinary difficulties s/t constipation - passed small amount of stool Saturday with feeling of incomplete defecation.     PLAN:  -UA does not appear infected, UC pending. She was started on Keflex Sunday  -no indication to continue indwelling Adhikari from Urology standpoint as she did not have retention today when catheter placed.    -Recommend bowel  regimen, can discontinue Adhikari prior to discharge.  -Encouraged hydration and stool softeners to help prevent constipation once her acute issue is resolved.   -Discussed with patient, nursing and attending.   -Urology is signing off. Please call as needed with any further issues.       Florida Payne NP         [1]   Social History  Tobacco Use    Smoking status: Every Day     Types: Vaping with nicotine    Smokeless tobacco: Never    Tobacco comments:     Used as an oral fixation to calm myself. Attempted to quit.   Substance Use Topics    Alcohol use: Not Currently    Drug use: Never   [2]   No current facility-administered medications for this encounter.

## 2025-07-02 NOTE — H&P
ante - History & Physical    Chief Complaint: urinary retention     HPI:      Jana Alejo is a 29 y.o.  at 14w1d who presents today for evaluation of above chief complaint.    Complains of inability to urinate for days. Severe pain. Must crede  in order to urinate. Has gone 12 hours without urination this weekend. Recent ER visit showed 700cc of urine in bladder and pt inability to urinate. Pt reports the ER declined to cath her.  On abx for UTI     No chief complaint on file.       Patient's last menstrual period was 2025 (exact date).     OB History    Para Term  AB Living   2 1 1   1   SAB IAB Ectopic Multiple Live Births       1      # Outcome Date GA Lbr Juan Miguel/2nd Weight Sex Type Anes PTL Lv   2 Current            1 Term 13 40w0d  3.175 kg (7 lb) M Vag-Spont   XIOMY       Past Medical History:   Diagnosis Date    Anxiety     Depression     Migraines        Past Surgical History:   Procedure Laterality Date    EPISIOTOMY      NASAL SEPTUM SURGERY      TONSILLECTOMY         Family History   Problem Relation Name Age of Onset    Leukemia Father Maco Alejo     Cancer Father Maco Alejo         Lympho sarcoma and leukemia    Depression Brother         Social History     Socioeconomic History    Marital status: Single   Occupational History    Occupation: Teacher   Tobacco Use    Smoking status: Every Day     Types: Vaping with nicotine    Smokeless tobacco: Never    Tobacco comments:     Used as an oral fixation to calm myself. Attempted to quit.   Substance and Sexual Activity    Alcohol use: Not Currently    Drug use: Never    Sexual activity: Not Currently     Partners: Male     Birth control/protection: None       Current Medications[1]    Review of patient's allergies indicates:   Allergen Reactions    Albuterol Other (See Comments)     Irritability and issues with temperature regulation         Physical Exam:      /68   Pulse (!) 59   Temp 97.6 °F (36.4 °C)  (Oral)   Resp 18   LMP 03/25/2025 (Exact Date)   SpO2 100%   There is no height or weight on file to calculate BMI.     APPEARANCE: Well nourished, well developed, in no acute distress.  PSYCH: Appropriate mood and affect.  CHEST: Normal respiratory effort,  CV: Normal capillary refill.  ABDOMEN: Soft.  No tenderness or masses.    PELVIC:  deferred   EXTREMITIES: No edema       Results:     Results for orders placed or performed during the hospital encounter of 07/01/25   Urinalysis    Collection Time: 07/01/25  3:29 PM   Result Value Ref Range    Color, UA Light-Yellow Yellow, Light-Yellow, Colorless, Straw, Dark-Yellow    Appearance, UA Clear Clear    Specific Gravity, UA 1.015 1.005 - 1.030    pH, UA 5.0 5.0 - 8.5    Protein, UA Negative Negative    Glucose, UA Normal Negative, Normal    Ketones, UA Negative Negative    Blood, UA Negative Negative    Bilirubin, UA Negative Negative    Urobilinogen, UA Normal 0.2, 1.0, Normal    Nitrites, UA Negative Negative    Leukocyte Esterase, UA Negative Negative    RBC, UA None Seen None Seen, 0-2, 3-5, 0-5 /HPF    WBC, UA 0-5 None Seen, 0-2, 3-5, 0-5 /HPF    Bacteria, UA Trace None Seen, Trace /HPF    Squamous Epithelial Cells, UA Trace None Seen, Trace /HPF         Assessment/Plan:     Active Problem List with Overview Notes    Diagnosis Date Noted    Acute urinary retention 07/01/2025    Supervision of other normal pregnancy, antepartum 06/19/2025    Nausea/vomiting in pregnancy 06/19/2025    Mixed anxiety depressive disorder 01/04/2023     Stopped meds with found out pregnant. Doing well off meds        Adhikari placed, 50cc out  Urology does not believe this is true retention, believes this is more of constipation, signed off  Adhikari out, pt voided prior to DC    Pt given suppository and had BM prior to DC     DC to home with strict precautions  F/u in 2w as scheduled    Marquise Dunne MD  07/02/2025 3:38 AM                     [1]   No current facility-administered  medications for this encounter.     Current Outpatient Medications   Medication Sig Dispense Refill    cephALEXin (KEFLEX) 500 MG capsule Take 1 capsule (500 mg total) by mouth every 12 (twelve) hours. for 10 days 20 capsule 0    prenatal vit no.130-iron-folic (PRENATAL VITAMIN) 27 mg iron- 800 mcg Tab Take 1 tablet by mouth Daily. 90 tablet 3

## 2025-07-03 LAB — BACTERIA UR CULT: NO GROWTH

## 2025-08-11 ENCOUNTER — TELEPHONE (OUTPATIENT)
Dept: OBSTETRICS AND GYNECOLOGY | Facility: HOSPITAL | Age: 30
End: 2025-08-11
Payer: MEDICAID